# Patient Record
Sex: FEMALE | Race: WHITE | NOT HISPANIC OR LATINO | Employment: STUDENT | URBAN - METROPOLITAN AREA
[De-identification: names, ages, dates, MRNs, and addresses within clinical notes are randomized per-mention and may not be internally consistent; named-entity substitution may affect disease eponyms.]

---

## 2023-07-16 ENCOUNTER — HOSPITAL ENCOUNTER (EMERGENCY)
Facility: CLINIC | Age: 18
Discharge: HOME | End: 2023-07-16
Attending: INTERNAL MEDICINE | Admitting: INTERNAL MEDICINE
Payer: MEDICAID

## 2023-07-16 ENCOUNTER — APPOINTMENT (OUTPATIENT)
Dept: CT IMAGING | Facility: CLINIC | Age: 18
End: 2023-07-16
Attending: INTERNAL MEDICINE
Payer: MEDICAID

## 2023-07-16 VITALS
DIASTOLIC BLOOD PRESSURE: 89 MMHG | WEIGHT: 145 LBS | HEART RATE: 112 BPM | RESPIRATION RATE: 18 BRPM | SYSTOLIC BLOOD PRESSURE: 140 MMHG | BODY MASS INDEX: 25.69 KG/M2 | HEIGHT: 63 IN | TEMPERATURE: 98 F

## 2023-07-16 DIAGNOSIS — S01.01XA SCALP LACERATION, INITIAL ENCOUNTER: ICD-10-CM

## 2023-07-16 DIAGNOSIS — S09.90XA MINOR HEAD INJURY, INITIAL ENCOUNTER: ICD-10-CM

## 2023-07-16 LAB — HCG SERPL QL: NEGATIVE

## 2023-07-16 PROCEDURE — 72125 CT NECK SPINE W/O DYE: CPT | Mod: 26 | Performed by: RADIOLOGY

## 2023-07-16 PROCEDURE — 99284 EMERGENCY DEPT VISIT MOD MDM: CPT | Mod: 25 | Performed by: INTERNAL MEDICINE

## 2023-07-16 PROCEDURE — 72125 CT NECK SPINE W/O DYE: CPT

## 2023-07-16 PROCEDURE — 70450 CT HEAD/BRAIN W/O DYE: CPT

## 2023-07-16 PROCEDURE — 84703 CHORIONIC GONADOTROPIN ASSAY: CPT | Performed by: INTERNAL MEDICINE

## 2023-07-16 PROCEDURE — 99284 EMERGENCY DEPT VISIT MOD MDM: CPT | Mod: 25

## 2023-07-16 PROCEDURE — 12002 RPR S/N/AX/GEN/TRNK2.6-7.5CM: CPT

## 2023-07-16 PROCEDURE — 12002 RPR S/N/AX/GEN/TRNK2.6-7.5CM: CPT | Performed by: INTERNAL MEDICINE

## 2023-07-16 PROCEDURE — 36415 COLL VENOUS BLD VENIPUNCTURE: CPT | Performed by: INTERNAL MEDICINE

## 2023-07-16 PROCEDURE — 70450 CT HEAD/BRAIN W/O DYE: CPT | Mod: 26 | Performed by: RADIOLOGY

## 2023-07-16 RX ORDER — LIDOCAINE HYDROCHLORIDE AND EPINEPHRINE 10; 10 MG/ML; UG/ML
10 INJECTION, SOLUTION INFILTRATION; PERINEURAL ONCE
Status: DISCONTINUED | OUTPATIENT
Start: 2023-07-16 | End: 2023-07-16 | Stop reason: HOSPADM

## 2023-07-16 ASSESSMENT — ENCOUNTER SYMPTOMS
CHILLS: 0
NAUSEA: 0
SHORTNESS OF BREATH: 0
HEADACHES: 1
VOMITING: 0
NUMBNESS: 0
CONFUSION: 0
ABDOMINAL PAIN: 0
COUGH: 0
WOUND: 1
WEAKNESS: 0
FEVER: 0

## 2023-07-16 ASSESSMENT — ACTIVITIES OF DAILY LIVING (ADL): ADLS_ACUITY_SCORE: 35

## 2023-07-16 NOTE — ED PROVIDER NOTES
"ED Provider Note  [facility]      History     Chief Complaint   Patient presents with     Head Laceration     HPI  Han Webber is a 17 year old female who presents with occipital head trauma. She tripped at the pool and fell striking the back of her head on the edge of the pool. She had no LOC. She has severe headache. She is bleeding from the back of her head. She denies pain in the neck or back. She had several wine drinks this morning. She has no nausea, vomiting, pain in the chest, abdomen, upper and lower extremities.     History reviewed. No pertinent past medical history.    History reviewed. No pertinent surgical history.    History reviewed. No pertinent family history.    Social History     Tobacco Use     Smoking status: Every Day     Types: Cigarettes     Smokeless tobacco: Never   Substance Use Topics     Alcohol use: Yes     Alcohol/week: 3.0 standard drinks of alcohol     Types: 3 Glasses of wine per week         Review of Systems   Constitutional: Negative for chills and fever.   HENT: Negative for ear discharge, hearing loss and nosebleeds.    Eyes: Negative for visual disturbance.   Respiratory: Negative for cough and shortness of breath.    Cardiovascular: Negative for chest pain.   Gastrointestinal: Negative for abdominal pain, nausea and vomiting.   Skin: Positive for wound. Negative for rash.   Neurological: Positive for headaches. Negative for weakness and numbness.   Psychiatric/Behavioral: Negative for confusion.   All other systems reviewed and are negative.      Physical Exam   BP: (!) 140/89  Pulse: 112  Temp: 98  F (36.7  C)  Resp: 18  Height: 160 cm (5' 3\")  Weight: 65.8 kg (145 lb)  Physical Exam  Vitals and nursing note reviewed.   Constitutional:       Appearance: Normal appearance.   HENT:      Head: Normocephalic. Laceration present. No raccoon eyes or Romero's sign.        Right Ear: External ear normal.      Left Ear: External ear normal.      Nose: " Nose normal.      Mouth/Throat:      Mouth: Mucous membranes are moist.   Eyes:      General: No scleral icterus.     Extraocular Movements: Extraocular movements intact.      Pupils: Pupils are equal, round, and reactive to light.   Cardiovascular:      Rate and Rhythm: Normal rate and regular rhythm.   Pulmonary:      Effort: Pulmonary effort is normal.      Breath sounds: Normal breath sounds.   Abdominal:      General: Abdomen is flat.      Palpations: Abdomen is soft.   Musculoskeletal:         General: No tenderness.      Cervical back: No tenderness.      Right lower leg: No edema.      Left lower leg: No edema.   Skin:     General: Skin is warm and dry.   Neurological:      General: No focal deficit present.      Mental Status: She is alert and oriented to person, place, and time.      Cranial Nerves: No cranial nerve deficit.      Sensory: No sensory deficit.      Motor: No weakness.   Psychiatric:         Mood and Affect: Mood normal. Affect is tearful.         Speech: Speech normal.         Cognition and Memory: Cognition normal.           ED Course, Procedures, & Data      Procedures       [facility] Procedure Note        Laceration Repair:    Performed by: JAMIE LOO MD  Authorized by: JAMIE LOO MD  Consent given by: Patient who states understanding of the procedure being performed after discussing the risks, benefits and alternatives.    Preparation: Patient was prepped and draped in usual sterile fashion.  Irrigation solution: saline    Body area:scalp  Laceration length: 4cm  Contamination: The wound is not contaminated.  Foreign bodies:none  Tendon involvement: none  Anesthesia: Local  Local anesthetic: Lidocaine     1%, with epinephrine  Anesthetic total: 5ml    Debridement: none  Skin closure: Closed with 15 Staples  Technique: interrupted  Approximation: close  Approximation difficulty: simple    Patient tolerance: Patient tolerated the procedure well with no immediate  complications.       Labs/Imaging    Results for orders placed or performed during the hospital encounter of 07/16/23 (from the past 24 hour(s))   HCG qualitative pregnancy (blood)   Result Value Ref Range    hCG Serum Qualitative Negative Negative   CT Head w/o Contrast    Narrative    CT HEAD W/O CONTRAST 7/16/2023 4:57 PM    Provided History: fall occipital trauma    Comparison: None.    Technique: Using multidetector thin collimation helical acquisition  technique, axial, coronal and sagittal CT images from the skull base  to the vertex were obtained without intravenous contrast.     Findings:    No intracranial hemorrhage. No mass effect. No midline shift. No  extra-axial fluid collection. The gray to white matter differentiation  of the cerebral hemispheres is preserved. Ventricles are proportionate  to the sulci. No sulcal effacement. The basal cisterns are patent.    The visualized paranasal sinuses are clear. The mastoid air cells are  clear. Orbits appear unremarkable. No acute fracture.      Impression    Impression: No acute intracranial pathology.    I have personally reviewed the examination and initial interpretation  and I agree with the findings.    NU MORRELL MD         SYSTEM ID:  [id number]   CT Cervical Spine w/o Contrast    Narrative    EXAM: CT CERVICAL SPINE W/O CONTRAST  7/16/2023 4:57 PM     HISTORY:  fall, occipital trauma       COMPARISON:  None    TECHNIQUE: Using multidetector thin collimation helical acquisition  technique, axial, coronal and sagittal CT images through the cervical  spine were obtained without intravenous contrast.    FINDINGS:  Mild reversal of the normal cervical lordotic curvature, likely  positional.. No acute fracture or traumatic subluxation. No loss of  intervertebral disc height. No prevertebral edema.    The findings on a level by level basis are as follows:    C2-3:  No spinal canal or neural foraminal stenosis.    C3-4:  No spinal canal or neural foraminal  stenosis.    C4-5:  No spinal canal or neural foraminal stenosis.    C5-6:  No spinal canal or neural foraminal stenosis.    C6-7:  No spinal canal or neural foraminal stenosis.    C7-T1:  No spinal canal or neural foraminal stenosis.     No abnormality of the paraspinous soft tissues.      Impression    IMPRESSION: No acute fracture or traumatic subluxation.    I have personally reviewed the examination and initial interpretation  and I agree with the findings.    NU MORRELL MD         SYSTEM ID:  [id number]            Medications   lidocaine 1% with EPINEPHrine 1:100,000 injection 10 mL (has no administration in time range)       CT Cervical Spine w/o Contrast   Final Result   IMPRESSION: No acute fracture or traumatic subluxation.      I have personally reviewed the examination and initial interpretation   and I agree with the findings.      NU MORRELL MD            SYSTEM ID:  [id number]      CT Head w/o Contrast   Final Result   Impression: No acute intracranial pathology.      I have personally reviewed the examination and initial interpretation   and I agree with the findings.      NU MORRELL MD            SYSTEM ID:  [id number]             Critical care was not performed.     Medical Decision Making  The patient's presentation was of moderate complexity (an acute illness with systemic symptoms).    The patient's evaluation involved:  ordering and/or review of 2 test(s) in this encounter (see separate area of note for details)    The patient's management necessitated moderate risk (a decision regarding minor procedure (laceration repair) with risk factors of none).      Assessment & Plan    Impression:  Generally healthy young woman with reported history of past concussions presented with an occipital scalp laceration sustained when she slipped and hit the back of her head on the edge of a swimming pool at her apartment. She had no LOC. She has no focal neurologic deficits. CT of the head and cervical  spine were reviewed by radiology and by myself. There is no evidence of fracture or hemorrhage. There was soft tissue defect in the occipital scalp consistent with the known laceration. The wound was closed with 15 staples with adequate approximation of the wound margins.     I have reviewed the nursing notes. I have reviewed the findings, diagnosis, plan and need for follow up with the patient.    New Prescriptions    No medications on file       Final diagnoses:   Scalp laceration, initial encounter   Minor head injury, initial encounter         [facility] EMERGENCY DEPARTMENT  7/16/2023     Isac Nixon MD  07/16/23 3420

## 2023-07-16 NOTE — ED TRIAGE NOTES
Pt presents after falling and hitting her head from a standing position.  Laceration and bleeding to the back of her head.  Denies loss of consciousness. Pt endorses alcohol consumption of 3 mimosas today.  Has history of concussions earlier in childhood.       Triage Assessment     Row Name 07/16/23 1539       Triage Assessment (Adult)    Airway WDL WDL       Respiratory WDL    Respiratory WDL WDL       Skin Circulation/Temperature WDL    Skin Circulation/Temperature WDL WDL       Cardiac WDL    Cardiac WDL WDL       Peripheral/Neurovascular WDL    Peripheral Neurovascular WDL WDL       Cognitive/Neuro/Behavioral WDL    Cognitive/Neuro/Behavioral WDL WDL

## 2023-07-16 NOTE — LETTER
July 16, 2023      To Whom It May Concern:      Han Webber was seen in our Emergency Department today, 07/16/23.  I expect her condition to improve over the next 1-2 days.  She may return to work/school when improved.    Sincerely,        JAMIE LOO MD

## 2023-07-16 NOTE — DISCHARGE INSTRUCTIONS
Bacitracin/ gauze dressing.  Keep the wound clean and dry.  Have the staples removed in 7-10 days. (15 staples)  Follow up [facility] [phone] or Primary [facility] [phone] or with your primary clinic.

## 2023-07-16 NOTE — ED NOTES
Pt, with friends, in lobby taking pictures with other patients actively receiving treatment in background. Patient told that this is a HIPAA violation. Pt showed this RN that she deleted photos.     After this encounter pt was brought back to vertical treatment for sutures. While this RN was taking care of another pt in the area, patient Akbar starting cursing about providers and RNs that were helping her. This RN told her that this is inappropriate and that it would be appreciated if she watched her language and would refrain from insulting/cursing at medical staff.    When this RN left the area, pt and visitor's behavior remained unchanged. Visitor asked to leave vertical treatment area. Pt became more upset and spoke with security. This RN was asked to clarify why these actions were taken. This RN explained, again, that behaviors like this are not tolerated in an environment of healing and patient care. Pt yelled at this RN, this RN explained to Pt that we are more than happy to treat her, however if she would like to leave she's more than welcome to. Pt stated that she wanted to continue with treatment.     Pt was placed in new treatment area and is awaiting sutures.

## 2023-07-24 ENCOUNTER — ALLIED HEALTH/NURSE VISIT (OUTPATIENT)
Dept: FAMILY MEDICINE | Facility: CLINIC | Age: 18
End: 2023-07-24
Payer: MEDICAID

## 2023-07-24 DIAGNOSIS — Z48.02 REMOVAL OF STAPLES: Primary | ICD-10-CM

## 2023-07-24 PROCEDURE — 99207 PR NO CHARGE NURSE ONLY: CPT

## 2023-07-24 NOTE — PROGRESS NOTES
Han Webber presents to the clinic today for removal of staples.  The patient has had the staples in place for 8 days.  There has been no history of infection or drainage.  15 staples are seen located on the occipital of the head.  The wound is healing well with no signs of infection.  All staples were easily removed today.  Routine wound care discussed.  The patient will follow up as needed.

## 2024-06-02 ENCOUNTER — HOSPITAL ENCOUNTER (EMERGENCY)
Facility: CLINIC | Age: 19
Discharge: HOME | End: 2024-06-02
Admitting: STUDENT IN AN ORGANIZED HEALTH CARE EDUCATION/TRAINING PROGRAM
Payer: MEDICAID

## 2024-06-02 VITALS
DIASTOLIC BLOOD PRESSURE: 76 MMHG | SYSTOLIC BLOOD PRESSURE: 112 MMHG | RESPIRATION RATE: 20 BRPM | OXYGEN SATURATION: 98 % | HEART RATE: 69 BPM | TEMPERATURE: 97.9 F

## 2024-06-02 DIAGNOSIS — L23.7 PHYTOPHOTODERMATITIS: ICD-10-CM

## 2024-06-02 PROCEDURE — 99282 EMERGENCY DEPT VISIT SF MDM: CPT

## 2024-06-02 ASSESSMENT — ACTIVITIES OF DAILY LIVING (ADL): ADLS_ACUITY_SCORE: 33

## 2024-06-02 ASSESSMENT — COLUMBIA-SUICIDE SEVERITY RATING SCALE - C-SSRS
1. IN THE PAST MONTH, HAVE YOU WISHED YOU WERE DEAD OR WISHED YOU COULD GO TO SLEEP AND NOT WAKE UP?: NO
6. HAVE YOU EVER DONE ANYTHING, STARTED TO DO ANYTHING, OR PREPARED TO DO ANYTHING TO END YOUR LIFE?: NO
2. HAVE YOU ACTUALLY HAD ANY THOUGHTS OF KILLING YOURSELF IN THE PAST MONTH?: NO

## 2024-06-02 NOTE — ED PROVIDER NOTES
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 She states the rash started on the front of her right thigh last week while she was in [place]. She got back on Thursday night and noticed that it had gotten worse. It initially had a few small blisters that have since subsided. No weeping. She states it is occasionally painful and gives her a \"burning\" sensation. She was seen in urgent care a few days ago and started on a topical triamcinolone cream as well as topical mupirocin ointment.  She denies any improvement in the rash since starting the topical medications, causing her to present to the emergency department.    Vitals reviewed and unremarkable, she is afebrile. On exam she is resting comfortably. Differential diagnosis includes but not limited to phytophotodermatitis, urticaria, shingles, contact dermatitis, SJS. On exam there are several streaks of dusky red, non raised markings on the anterior right thigh. No pruritus and this does not appear consistent with urticaria. The rash is not vesicular and is not in a dermatomal distribution, I do not suspect shingles. There are no bullae or blisters currently, although she notes that there were blisters at the beginning of the formation of the rash. She does not have any mucous membrane involvement and the rash does not appear consistent with SJS. The distribution is overall in a bizarre configuration that appears like it could be consistent with finger markings (see photo below in physical exam section). She does have occasional " burning of the area. Upon further questioning patient does note that she was drinking several drinks in [place] that had citrus fruits on them. I think her rash today is most consistent with that of phytophotodermatitis, especially given it had some blistering present initially. There is no underlying swelling or erythema or warmth to raise concern for secondary cellulitis at this time.  I recommended she continue triamcinolone ointment, discontinue the mupirocin ointment.  She was reassured that the markings are overall benign but may take several weeks to months to fully subside. She was educated on concerning symptoms that would warrant return to the emergency department and is understanding and agreeable to this plan.      History:  Supplemental history from: Documented in chart  External Record(s) reviewed: Documented in chart    Work Up:  Chart documentation includes differential considered and any EKGs or imaging independently interpreted by provider, where specified.  In additional to work up documented, I considered the following work up: Documented in chart, if applicable.    External consultation:  Discussion of management with another provider: Documented in chart, if applicable    Complicating factors:  Care impacted by chronic illness: N/A  Care affected by social determinants of health: N/A    Disposition considerations: Discharge. No recommendations on prescription strength medication(s). See documentation for any additional details.      ED COURSE:  3:30 PM I met and introduced myself to the patient. I gathered initial history and performed my physical exam. We discussed discharge, follow up, and reasons to return to the ED.     At the conclusion of the encounter I discussed the results of all the tests and the disposition. The questions were answered. The patient or family acknowledged understanding and was agreeable with the care plan.          MEDICATIONS GIVEN IN THE EMERGENCY  "DEPARTMENT:  Medications - No data to display      NEW PRESCRIPTIONS STARTED AT TODAY'S ED VISIT:  New Prescriptions    No medications on file         HPI   Patient information was obtained from: patient  Use of Intrepreter: N/A     Han is a 18 year old female with no significant PMH presenting to the ED for evaluation of a rash. She states the rash started on the front of her right thigh last week while she was in [place]. She got back on Thursday night and noticed that it had gotten worse. It initially had a few small blisters that have since subsided. No weeping. She states it is occasionally painful and gives her a \"burning\" sensation. She was seen in urgent care a few days ago and started on a topical triamcinolone cream as well as topical mupirocin ointment.  She denies any improvement in the rash since starting the topical medications, causing her to present to the emergency department.        Medical History     No past medical history on file.    No past surgical history on file.    No family history on file.    Social History     Tobacco Use    Smoking status: Every Day     Types: Cigarettes    Smokeless tobacco: Never   Substance Use Topics    Alcohol use: Yes     Alcohol/week: 3.0 standard drinks of alcohol     Types: 3 Glasses of wine per week    Drug use: Not Currently       No current outpatient medications on file.        Physical Exam     First Vitals:  Patient Vitals for the past 24 hrs:   BP Temp Temp src Pulse Resp SpO2   06/02/24 1520 112/76 97.9  F (36.6  C) Oral 69 20 98 %         PHYSICAL EXAM    General Appearance:  Alert, cooperative, no distress, appears stated age  Musculoskeletal: Moving all extremities. No gross deformities  Integument: Warm, dry.  There are dusky red, non-raised, linear markings present on the anterior aspect of the right thigh.  No signs of excoriation.  No blisters or vesicles.  No active weeping. No mucous membrane involvement.  See photo below.  Neurologic: Alert " and orientated x3. No focal deficits.  Psych: Normal mood and affect                  Results     LAB:  All pertinent labs reviewed and interpreted  Labs Ordered and Resulted from Time of ED Arrival to Time of ED Departure - No data to display    RADIOLOGY:  No orders to display         ECG:  N/A      PROCEDURES:  N/A      Paola Rangel PA-C   Emergency Medicine   [facility] EMERGENCY ROOM       Paola Rangel PA-C  06/02/24 [id number]

## 2024-06-02 NOTE — ED TRIAGE NOTES
"Was in [place] recently, developed a red \"streaky\" rash to Rt thigh; has been getting worse since Thursday, was seen by urgent care as it was blistering. Was given steroid and abx creams without improvement. Now spreading to Rt hip and \"looks more bruised.\" No insect bite or any other infected contacts. ABCs intact.     Triage Assessment (Adult)       Row Name 06/02/24 3604          Triage Assessment    Airway WDL WDL        Respiratory WDL    Respiratory WDL WDL        Skin Circulation/Temperature WDL    Skin Circulation/Temperature WDL X  rash to Rt upper thigh        Cardiac WDL    Cardiac WDL WDL        Peripheral/Neurovascular WDL    Peripheral Neurovascular WDL WDL        Cognitive/Neuro/Behavioral WDL    Cognitive/Neuro/Behavioral WDL WDL                     "

## 2024-06-02 NOTE — DISCHARGE INSTRUCTIONS
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  This is caused when plant-derived substances and citrus are on the skin and then you are exposed to a lot of sun     I recommend placing aloe over the area to treated as a burn with daily aloe.  Be sure you are wearing lots of sunscreen to prevent further irritation to the area. It will likely take many weeks for the discoloration to subside, even months.    You can continue triamcinolone daily for 2 weeks to see if this helps with pain and discoloration, discontinue the other ointment.    Follow-up with dermatology for any further evaluation.  Return to the ER if you develop any fevers, swelling of the area with overlying redness and warmth, or any pus drainage from the rash.